# Patient Record
Sex: FEMALE | Race: BLACK OR AFRICAN AMERICAN | NOT HISPANIC OR LATINO | URBAN - METROPOLITAN AREA
[De-identification: names, ages, dates, MRNs, and addresses within clinical notes are randomized per-mention and may not be internally consistent; named-entity substitution may affect disease eponyms.]

---

## 2021-04-09 ENCOUNTER — OFFICE VISIT (OUTPATIENT)
Dept: URBAN - METROPOLITAN AREA SURGERY CENTER 18 | Facility: SURGERY CENTER | Age: 46
End: 2021-04-09

## 2021-04-20 ENCOUNTER — OFFICE VISIT (OUTPATIENT)
Dept: URBAN - METROPOLITAN AREA SURGERY CENTER 15 | Facility: SURGERY CENTER | Age: 46
End: 2021-04-20

## 2021-10-29 ENCOUNTER — OFFICE VISIT (OUTPATIENT)
Dept: URBAN - METROPOLITAN AREA CLINIC 78 | Facility: CLINIC | Age: 46
End: 2021-10-29
Payer: COMMERCIAL

## 2021-10-29 DIAGNOSIS — Z90.3 S/P GASTRIC SLEEVE PROCEDURE: ICD-10-CM

## 2021-10-29 DIAGNOSIS — K59.02 CONSTIPATION BY OUTLET DYSFUNCTION: ICD-10-CM

## 2021-10-29 DIAGNOSIS — R10.84 ABDOMINAL PAIN, GENERALIZED: ICD-10-CM

## 2021-10-29 DIAGNOSIS — Z90.710 H/O HYSTERECTOMY FOR BENIGN DISEASE: ICD-10-CM

## 2021-10-29 PROBLEM — 428804004: Status: ACTIVE | Noted: 2021-10-29

## 2021-10-29 PROBLEM — 14760008: Status: ACTIVE | Noted: 2021-10-29

## 2021-10-29 PROCEDURE — 99203 OFFICE O/P NEW LOW 30 MIN: CPT | Performed by: INTERNAL MEDICINE

## 2021-10-29 PROCEDURE — 99243 OFF/OP CNSLTJ NEW/EST LOW 30: CPT | Performed by: INTERNAL MEDICINE

## 2021-10-29 RX ORDER — AZILSARTAN KAMEDOXOMIL AND CHLORTHALIDONE 40; 25 MG/1; MG/1
1 TABLET TABLET ORAL ONCE A DAY
Status: ACTIVE | COMMUNITY

## 2021-10-29 RX ORDER — NEBIVOLOL HYDROCHLORIDE 5 MG/1
1 TABLET TABLET ORAL ONCE A DAY
Status: ACTIVE | COMMUNITY

## 2021-10-29 NOTE — HPI-TODAY'S VISIT:
Dilation and curettage  11/27/2017    Active  DERIK Patient was referred by Dr. Sallie Garcia /Dr Felicita Zimmerman is her Gynae A copy of this document will be sent to the physician.   Patient has personal hx hsyterectomy 2019 , s/p Gastric sleeve , abdominoplasty . Personal of flexible during hospitalization in 2019 for narcotics bowel which revealed stercoral ulcer  She reports pain 3-4/10 b/l along inguinal area  Last 3-4 mts    BM : Fine now though she has hx of constipation and past and stercoral ulcer on flex sig  Previous GI is Dr Carolina , reviewed in chart   Denies rectal bleeding, weight loss or melena  Unsure if she had b/l oophorectomy but Hystrectomy total was for Fibroids <<-----Click on this checkbox to enter Procedure

## 2021-11-12 ENCOUNTER — OFFICE VISIT (OUTPATIENT)
Dept: URBAN - METROPOLITAN AREA CLINIC 22 | Facility: CLINIC | Age: 46
End: 2021-11-12
Payer: COMMERCIAL

## 2021-11-12 DIAGNOSIS — K76.0 FATTY (CHANGE OF) LIVER: ICD-10-CM

## 2021-11-12 DIAGNOSIS — K82.8 GALLBLADDER SLUDGE: ICD-10-CM

## 2021-11-12 PROCEDURE — 76700 US EXAM ABDOM COMPLETE: CPT | Performed by: INTERNAL MEDICINE

## 2021-11-12 RX ORDER — NEBIVOLOL HYDROCHLORIDE 5 MG/1
1 TABLET TABLET ORAL ONCE A DAY
Status: ACTIVE | COMMUNITY

## 2021-11-12 RX ORDER — AZILSARTAN KAMEDOXOMIL AND CHLORTHALIDONE 40; 25 MG/1; MG/1
1 TABLET TABLET ORAL ONCE A DAY
Status: ACTIVE | COMMUNITY

## 2021-12-22 ENCOUNTER — OFFICE VISIT (OUTPATIENT)
Dept: URBAN - METROPOLITAN AREA CLINIC 96 | Facility: CLINIC | Age: 46
End: 2021-12-22
Payer: COMMERCIAL

## 2021-12-22 VITALS
BODY MASS INDEX: 31.01 KG/M2 | HEART RATE: 76 BPM | TEMPERATURE: 96.9 F | SYSTOLIC BLOOD PRESSURE: 111 MMHG | WEIGHT: 175 LBS | DIASTOLIC BLOOD PRESSURE: 78 MMHG | HEIGHT: 63 IN

## 2021-12-22 DIAGNOSIS — R10.31 RLQ ABDOMINAL PAIN: ICD-10-CM

## 2021-12-22 DIAGNOSIS — R10.13 DYSPEPSIA: ICD-10-CM

## 2021-12-22 DIAGNOSIS — Z12.11 ENCOUNTER FOR SCREENING COLONOSCOPY: ICD-10-CM

## 2021-12-22 DIAGNOSIS — R10.11 RUQ PAIN: ICD-10-CM

## 2021-12-22 DIAGNOSIS — Z87.11 PERSONAL HISTORY OF GASTRIC ULCER: ICD-10-CM

## 2021-12-22 DIAGNOSIS — R12 HEARTBURN: ICD-10-CM

## 2021-12-22 PROCEDURE — 99214 OFFICE O/P EST MOD 30 MIN: CPT | Performed by: INTERNAL MEDICINE

## 2021-12-22 RX ORDER — SODIUM PICOSULFATE, MAGNESIUM OXIDE, AND ANHYDROUS CITRIC ACID 10; 3.5; 12 MG/160ML; G/160ML; G/160ML
160 ML LIQUID ORAL
Qty: 320 MILLILITER | Refills: 0 | OUTPATIENT
Start: 2021-12-22 | End: 2021-12-23

## 2021-12-22 RX ORDER — NEBIVOLOL HYDROCHLORIDE 5 MG/1
1 TABLET TABLET ORAL ONCE A DAY
Status: DISCONTINUED | COMMUNITY

## 2021-12-22 RX ORDER — AZILSARTAN KAMEDOXOMIL AND CHLORTHALIDONE 40; 25 MG/1; MG/1
1 TABLET TABLET ORAL ONCE A DAY
Status: ACTIVE | COMMUNITY

## 2021-12-22 NOTE — PHYSICAL EXAM GASTROINTESTINAL
Abdomen , scars, soft, tender in RLQ with no rebound or guarding, nondistended , no guarding or rigidity , no masses palpable , normal bowel sounds , Liver and Spleen , no hepatomegaly present , no hepatosplenomegaly , liver nontender , spleen not palpable

## 2021-12-22 NOTE — HPI-TODAY'S VISIT:
46-year-old female recently seen by Dr. Lal 10/29/2021 for abdominal cramping.  Patient is status post gastric sleeve.  Dr. Lal had ordered ultrasound 11/12/2021 which demonstrated mild fatty infiltration of the liver as well as a probable 4 mm stone in the gallbladder.  Patient also had constipation and reported history of stercoral ulcer in setting of narcotic use post surgery.    Patient wanted another GI opinion.  Patient did have prior inpatient flexible sigmoidoscopy performed by Dr. James 5/7/2019 for evaluation of abnormal CT (CT report not available for review).  Fair prep was noted.  Single ulcer in the rectum was noted.  Consistent with stercoral ulcer.  Biopsies obtained with pathology demonstrating focal inflammatory changes consistent with ulceration.  Patient now reports RLQ pain onset 10/10/2021 with radiation to upper RUQ. Worse at night, pulsing sensation. No f/c, no no change in BM with baseline stools once daily. Was also seen by surgeon Dr. Willingham this week as well. Did not feel that was gallstone related. He also mentioned may be related to scar tissue. Worse with jumping and high intensity work outs. Perhaps worse with fatty meal intake. No dysphagia, no odynophagia.   No food allergies, no diet restrictions.  Report perhaps heartburn issues. Not taking any meds for GERD. No prior hx of PUD. No hx of H pylori. Remote EGD with Dr. Carolina with ulcer per patient, prior to bariatric surgery, negative for H pylori. No NSAIDs.   UTD with GYN with pelvic ultrasound was unremarkable with normal ovaries per patient, s/o hysteretcomy.  No prior colonoscopy.

## 2021-12-27 ENCOUNTER — TELEPHONE ENCOUNTER (OUTPATIENT)
Dept: URBAN - METROPOLITAN AREA CLINIC 96 | Facility: CLINIC | Age: 46
End: 2021-12-27

## 2022-01-07 ENCOUNTER — TELEPHONE ENCOUNTER (OUTPATIENT)
Dept: URBAN - METROPOLITAN AREA CLINIC 98 | Facility: CLINIC | Age: 47
End: 2022-01-07

## 2022-01-10 ENCOUNTER — OFFICE VISIT (OUTPATIENT)
Dept: URBAN - METROPOLITAN AREA SURGERY CENTER 15 | Facility: SURGERY CENTER | Age: 47
End: 2022-01-10

## 2022-03-03 ENCOUNTER — TELEPHONE ENCOUNTER (OUTPATIENT)
Dept: URBAN - METROPOLITAN AREA CLINIC 40 | Facility: CLINIC | Age: 47
End: 2022-03-03

## 2022-03-07 ENCOUNTER — TELEPHONE ENCOUNTER (OUTPATIENT)
Dept: URBAN - METROPOLITAN AREA CLINIC 96 | Facility: CLINIC | Age: 47
End: 2022-03-07

## 2022-03-17 ENCOUNTER — OFFICE VISIT (OUTPATIENT)
Dept: URBAN - METROPOLITAN AREA SURGERY CENTER 18 | Facility: SURGERY CENTER | Age: 47
End: 2022-03-17
Payer: COMMERCIAL

## 2022-03-17 ENCOUNTER — CLAIMS CREATED FROM THE CLAIM WINDOW (OUTPATIENT)
Dept: URBAN - METROPOLITAN AREA CLINIC 4 | Facility: CLINIC | Age: 47
End: 2022-03-17
Payer: COMMERCIAL

## 2022-03-17 DIAGNOSIS — K31.89 ACQUIRED DEFORMITY OF DUODENUM: ICD-10-CM

## 2022-03-17 DIAGNOSIS — K21.9 GASTRO-ESOPHAGEAL REFLUX DISEASE WITHOUT ESOPHAGITIS: ICD-10-CM

## 2022-03-17 DIAGNOSIS — K31.89 FOCAL FOVEOLAR HYPERPLASIA: ICD-10-CM

## 2022-03-17 DIAGNOSIS — K21.9 ACID REFLUX: ICD-10-CM

## 2022-03-17 PROCEDURE — 88305 TISSUE EXAM BY PATHOLOGIST: CPT | Performed by: PATHOLOGY

## 2022-03-17 PROCEDURE — G8907 PT DOC NO EVENTS ON DISCHARG: HCPCS | Performed by: INTERNAL MEDICINE

## 2022-03-17 PROCEDURE — 45378 DIAGNOSTIC COLONOSCOPY: CPT | Performed by: INTERNAL MEDICINE

## 2022-03-17 PROCEDURE — 43239 EGD BIOPSY SINGLE/MULTIPLE: CPT | Performed by: INTERNAL MEDICINE

## 2022-03-17 RX ORDER — AZILSARTAN KAMEDOXOMIL AND CHLORTHALIDONE 40; 25 MG/1; MG/1
1 TABLET TABLET ORAL ONCE A DAY
Status: ACTIVE | COMMUNITY

## 2023-01-11 ENCOUNTER — OFFICE VISIT (OUTPATIENT)
Dept: URBAN - METROPOLITAN AREA CLINIC 96 | Facility: CLINIC | Age: 48
End: 2023-01-11

## 2023-01-11 RX ORDER — AZILSARTAN KAMEDOXOMIL AND CHLORTHALIDONE 40; 25 MG/1; MG/1
1 TABLET TABLET ORAL ONCE A DAY
Status: ACTIVE | COMMUNITY

## 2023-01-11 RX ORDER — ALUMINUM ZIRCONIUM TRICHLOROHYDREX GLY 0.19 G/G
1 TABLET 30 MINUTES BEFORE MORNING MEAL STICK TOPICAL ONCE A DAY
Status: ACTIVE | COMMUNITY

## 2023-01-11 NOTE — HPI-TODAY'S VISIT:
47-year-old female seen 12/22/2021. Previously seen by Dr. Lal 10/29/2021 for abdominal cramping.  Patient is status post gastric sleeve.  Dr. Lal had ordered ultrasound 11/12/2021 which demonstrated mild fatty infiltration of the liver as well as a probable 4 mm stone in the gallbladder.  Patient also had constipation and reported history of stercoral ulcer in setting of narcotic use post surgery.    Patient did have prior inpatient flexible sigmoidoscopy performed by Dr. James 5/7/2019 for evaluation of abnormal CT (CT report not available for review).  Fair prep was noted.  Single ulcer in the rectum was noted.  Consistent with stercoral ulcer.  Biopsies obtained with pathology demonstrating focal inflammatory changes consistent with ulceration.  Patient previously reported RLQ pain onset 10/10/2021 with radiation to upper RUQ. Worse at night, pulsing sensation. No f/c, no no change in BM with baseline stools once daily. Seen Dr. Willingham who did not feel that was gallstone related. He also mentioned may be related to scar tissue. Worse with jumping and high intensity work outs. Perhaps worse with fatty meal intake. No dysphagia, no odynophagia.   No food allergies, no diet restrictions.  Reported perhaps heartburn issues. No prior hx of PUD. No hx of H pylori. Remote EGD with Dr. Carolina with ulcer per patient, prior to bariatric surgery, negative for H pylori. No NSAIDs.   UTD with GYN with pelvic ultrasound was unremarkable with normal ovaries per patient, s/p hysteretcomy.  EGD and colonoscopy performed 3/17/2022 with sleeve gastrectomy noted.  Regular Z-line at 35 cm.  Small hiatal hernia, grade A esophagitis.  Prilosec OTC 20 mg daily was recommended.  Colonoscopy same date with few diverticuli noted in the sigmoid colon.  Internal hemorrhoids noted on retroflexion.  Otherwise, unremarkable examination.  Repeat colonoscopy was recommended 10 years for screening if no signs, symptoms of concern, or change in risk factors.  Pathology with gastric biopsies no significant abnormality.  Lower esophageal biopsies with reflux type changes.  No evidence of Titus's or eosinophilic esophagitis.  CT abdomen pelvis with contrast performed 1/4/2022 liver normal with no focal mass, no intrahepatic biliary ductal dilatation.  Gallbladder normal.  spleen normal.  Pancreas normal.  Adrenal glands normal.  Normal appendix.  No acute findings of the abdomen or pelvis.  Hysterectomy changes and no ovarian or adnexal masses. No inguinal hernia or fluid collection.  No adenopathy.  (See chart for reviewed records)

## 2023-02-08 ENCOUNTER — WEB ENCOUNTER (OUTPATIENT)
Dept: URBAN - METROPOLITAN AREA CLINIC 96 | Facility: CLINIC | Age: 48
End: 2023-02-08

## 2023-02-08 ENCOUNTER — OFFICE VISIT (OUTPATIENT)
Dept: URBAN - METROPOLITAN AREA CLINIC 96 | Facility: CLINIC | Age: 48
End: 2023-02-08
Payer: COMMERCIAL

## 2023-02-08 VITALS
HEIGHT: 63 IN | SYSTOLIC BLOOD PRESSURE: 112 MMHG | TEMPERATURE: 98.1 F | HEART RATE: 97 BPM | WEIGHT: 178 LBS | DIASTOLIC BLOOD PRESSURE: 80 MMHG | BODY MASS INDEX: 31.54 KG/M2

## 2023-02-08 DIAGNOSIS — R14.2 BELCHING: ICD-10-CM

## 2023-02-08 DIAGNOSIS — R12 HEARTBURN: ICD-10-CM

## 2023-02-08 DIAGNOSIS — Z87.11 PERSONAL HISTORY OF GASTRIC ULCER: ICD-10-CM

## 2023-02-08 DIAGNOSIS — R10.13 DYSPEPSIA: ICD-10-CM

## 2023-02-08 DIAGNOSIS — R10.31 RLQ ABDOMINAL PAIN: ICD-10-CM

## 2023-02-08 DIAGNOSIS — R10.11 RUQ PAIN: ICD-10-CM

## 2023-02-08 PROCEDURE — 99214 OFFICE O/P EST MOD 30 MIN: CPT | Performed by: INTERNAL MEDICINE

## 2023-02-08 RX ORDER — OMEPRAZOLE 40 MG/1
1 CAPSULE 30 MINUTES BEFORE MORNING MEAL CAPSULE, DELAYED RELEASE ORAL ONCE A DAY
Qty: 90 | Refills: 1 | OUTPATIENT
Start: 2023-02-08

## 2023-02-08 RX ORDER — ALUMINUM ZIRCONIUM TRICHLOROHYDREX GLY 0.19 G/G
1 TABLET 30 MINUTES BEFORE MORNING MEAL STICK TOPICAL ONCE A DAY
Status: DISCONTINUED | COMMUNITY

## 2023-02-08 RX ORDER — AZILSARTAN KAMEDOXOMIL AND CHLORTHALIDONE 40; 25 MG/1; MG/1
1 TABLET TABLET ORAL ONCE A DAY
Status: ACTIVE | COMMUNITY

## 2023-02-08 NOTE — PHYSICAL EXAM GASTROINTESTINAL
Abdomen , scars, soft, tender in RLQ with no rebound or guarding, nondistended , no guarding or rigidity , no masses palpable , normal bowel sounds , Liver and Spleen , no hepatomegaly present , no hepatosplenomegaly , liver nontender , spleen not palpable Sarecycline Counseling: Patient advised regarding possible photosensitivity and discoloration of the teeth, skin, lips, tongue and gums.  Patient instructed to avoid sunlight, if possible.  When exposed to sunlight, patients should wear protective clothing, sunglasses, and sunscreen.  The patient was instructed to call the office immediately if the following severe adverse effects occur:  hearing changes, easy bruising/bleeding, severe headache, or vision changes.  The patient verbalized understanding of the proper use and possible adverse effects of sarecycline.  All of the patient's questions and concerns were addressed.

## 2023-02-08 NOTE — HPI-TODAY'S VISIT:
47-year-old female seen 12/22/2021. Previously seen by Dr. Lal 10/29/2021 for abdominal cramping.  Patient is status post gastric sleeve.  Dr. Lal had ordered ultrasound 11/12/2021 which demonstrated mild fatty infiltration of the liver as well as a probable 4 mm stone in the gallbladder.  Patient also had constipation and reported history of stercoral ulcer in setting of narcotic use post surgery.    Patient did have prior inpatient flexible sigmoidoscopy performed by Dr. James 5/7/2019 for evaluation of abnormal CT (CT report not available for review).  Fair prep was noted.  Single ulcer in the rectum was noted.  Consistent with stercoral ulcer.  Biopsies obtained with pathology demonstrating focal inflammatory changes consistent with ulceration.  Patient previously reported RLQ pain onset 10/10/2021 with radiation to upper RUQ. Worse at night, pulsing sensation. No f/c, no no change in BM with baseline stools once daily. Seen Dr. Willingham who did not feel that was gallstone related. He also mentioned may be related to scar tissue. Worse with jumping and high intensity work outs. Perhaps worse with fatty meal intake. No dysphagia, no odynophagia.   No food allergies, no diet restrictions.  Reported perhaps heartburn issues. No prior hx of PUD. No hx of H pylori. Remote EGD with Dr. Carolina with ulcer per patient, prior to bariatric surgery, negative for H pylori. No NSAIDs.   UTD with GYN with pelvic ultrasound was unremarkable with normal ovaries per patient, s/p hysteretcomy.  EGD and colonoscopy performed 3/17/2022 with sleeve gastrectomy noted.  Regular Z-line at 35 cm.  Small hiatal hernia, grade A esophagitis.  Prilosec OTC 20 mg daily was recommended.  Colonoscopy same date with few diverticuli noted in the sigmoid colon.  Internal hemorrhoids noted on retroflexion.  Otherwise, unremarkable examination.  Repeat colonoscopy was recommended 10 years for screening if no signs, symptoms of concern, or change in risk factors.  Pathology with gastric biopsies no significant abnormality.  Lower esophageal biopsies with reflux type changes.  No evidence of Titus's or eosinophilic esophagitis.  CT abdomen pelvis with contrast performed 1/4/2022 liver normal with no focal mass, no intrahepatic biliary ductal dilatation.  Gallbladder normal.  spleen normal.  Pancreas normal.  Adrenal glands normal.  Normal appendix.  No acute findings of the abdomen or pelvis.  Hysterectomy changes and no ovarian or adnexal masses. No inguinal hernia or fluid collection.  No adenopathy.  (See chart for reviewed records)  Patient now presents for several months of "super gassy" with bloating, belching, flatulence. No change in BM, no n/v, worsening heartburn. Not taking any meds for GERD currently. No dysphagia, no odynophagia. Not taking any probiotics.

## 2023-04-04 ENCOUNTER — OFFICE VISIT (OUTPATIENT)
Dept: URBAN - METROPOLITAN AREA TELEHEALTH 2 | Facility: TELEHEALTH | Age: 48
End: 2023-04-04

## 2023-04-04 ENCOUNTER — TELEPHONE ENCOUNTER (OUTPATIENT)
Dept: URBAN - METROPOLITAN AREA CLINIC 35 | Facility: CLINIC | Age: 48
End: 2023-04-04

## 2023-04-04 RX ORDER — OMEPRAZOLE 40 MG/1
1 CAPSULE 30 MINUTES BEFORE MORNING MEAL CAPSULE, DELAYED RELEASE ORAL ONCE A DAY
Qty: 90 | Refills: 1 | Status: ACTIVE | COMMUNITY
Start: 2023-02-08

## 2023-04-04 RX ORDER — AZILSARTAN KAMEDOXOMIL AND CHLORTHALIDONE 40; 25 MG/1; MG/1
1 TABLET TABLET ORAL ONCE A DAY
Status: ACTIVE | COMMUNITY

## 2023-05-15 ENCOUNTER — OFFICE VISIT (OUTPATIENT)
Dept: URBAN - METROPOLITAN AREA CLINIC 96 | Facility: CLINIC | Age: 48
End: 2023-05-15

## 2023-05-15 RX ORDER — AZILSARTAN KAMEDOXOMIL AND CHLORTHALIDONE 40; 25 MG/1; MG/1
1 TABLET TABLET ORAL ONCE A DAY
Status: ACTIVE | COMMUNITY

## 2023-05-15 RX ORDER — OMEPRAZOLE 40 MG/1
1 CAPSULE 30 MINUTES BEFORE MORNING MEAL CAPSULE, DELAYED RELEASE ORAL ONCE A DAY
Qty: 90 | Refills: 1 | OUTPATIENT

## 2023-05-15 RX ORDER — OMEPRAZOLE 40 MG/1
1 CAPSULE 30 MINUTES BEFORE MORNING MEAL CAPSULE, DELAYED RELEASE ORAL ONCE A DAY
Qty: 90 | Refills: 1 | Status: ACTIVE | COMMUNITY
Start: 2023-02-08

## 2023-05-15 NOTE — HPI-TODAY'S VISIT:
47-year-old female seen 2/8/2023. As noted prior, seen by Dr. Lal 10/29/2021 for abdominal cramping.  Patient is status post gastric sleeve.  Dr. Lal had ordered ultrasound 11/12/2021 which demonstrated mild fatty infiltration of the liver as well as a probable 4 mm stone in the gallbladder.  Patient also had constipation and reported history of stercoral ulcer in setting of narcotic use post surgery.    Patient did have prior inpatient flexible sigmoidoscopy performed by Dr. James 5/7/2019 for evaluation of abnormal CT (CT report not available for review).  Fair prep was noted.  Single ulcer in the rectum was noted.  Consistent with stercoral ulcer.  Biopsies obtained with pathology demonstrating focal inflammatory changes consistent with ulceration.  Patient previously reported RLQ pain onset 10/10/2021 with radiation to upper RUQ. Worse at night, pulsing sensation. No f/c, no no change in BM with baseline stools once daily. Seen Dr. Willingham who did not feel that was gallstone related. He also mentioned may be related to scar tissue. Worse with jumping and high intensity work outs. Perhaps worse with fatty meal intake. No dysphagia, no odynophagia.   No food allergies, no diet restrictions.  Reported perhaps heartburn issues. No prior hx of PUD. No hx of H pylori. Remote EGD with Dr. Carolina with ulcer per patient, prior to bariatric surgery, negative for H pylori. No NSAIDs.   UTD with GYN with pelvic ultrasound was unremarkable with normal ovaries per patient, s/p hysteretcomy.  EGD and colonoscopy performed 3/17/2022 with sleeve gastrectomy noted.  Regular Z-line at 35 cm.  Small hiatal hernia, grade A esophagitis.  Prilosec OTC 20 mg daily was recommended.  Colonoscopy same date with few diverticuli noted in the sigmoid colon.  Internal hemorrhoids noted on retroflexion.  Otherwise, unremarkable examination.  Repeat colonoscopy was recommended 10 years for screening if no signs, symptoms of concern, or change in risk factors.  Pathology with gastric biopsies no significant abnormality.  Lower esophageal biopsies with reflux type changes.  No evidence of Titus's or eosinophilic esophagitis.  CT abdomen pelvis with contrast performed 1/4/2022 liver normal with no focal mass, no intrahepatic biliary ductal dilatation.  Gallbladder normal.  spleen normal.  Pancreas normal.  Adrenal glands normal.  Normal appendix.  No acute findings of the abdomen or pelvis.  Hysterectomy changes and no ovarian or adnexal masses. No inguinal hernia or fluid collection.  No adenopathy.  (See chart for reviewed records)  At prior visit, reported several months of "super gassy" with bloating, belching, flatulence. No change in BM, no n/v, worsening heartburn. Not taking any meds for GERD currently. No dysphagia, no odynophagia. Not taking any probiotics.

## 2023-07-11 ENCOUNTER — DASHBOARD ENCOUNTERS (OUTPATIENT)
Age: 48
End: 2023-07-11

## 2023-07-11 ENCOUNTER — OFFICE VISIT (OUTPATIENT)
Dept: URBAN - METROPOLITAN AREA CLINIC 96 | Facility: CLINIC | Age: 48
End: 2023-07-11
Payer: COMMERCIAL

## 2023-07-11 VITALS
SYSTOLIC BLOOD PRESSURE: 100 MMHG | TEMPERATURE: 97.3 F | HEART RATE: 88 BPM | BODY MASS INDEX: 31.18 KG/M2 | HEIGHT: 63 IN | WEIGHT: 176 LBS | DIASTOLIC BLOOD PRESSURE: 68 MMHG

## 2023-07-11 DIAGNOSIS — R12 HEARTBURN: ICD-10-CM

## 2023-07-11 DIAGNOSIS — R14.2 BELCHING: ICD-10-CM

## 2023-07-11 DIAGNOSIS — R10.31 RLQ ABDOMINAL PAIN: ICD-10-CM

## 2023-07-11 DIAGNOSIS — R10.11 RUQ PAIN: ICD-10-CM

## 2023-07-11 DIAGNOSIS — R10.13 DYSPEPSIA: ICD-10-CM

## 2023-07-11 DIAGNOSIS — Z87.11 PERSONAL HISTORY OF GASTRIC ULCER: ICD-10-CM

## 2023-07-11 PROCEDURE — 99213 OFFICE O/P EST LOW 20 MIN: CPT | Performed by: INTERNAL MEDICINE

## 2023-07-11 RX ORDER — AZILSARTAN KAMEDOXOMIL AND CHLORTHALIDONE 40; 25 MG/1; MG/1
1 TABLET TABLET ORAL ONCE A DAY
Status: ACTIVE | COMMUNITY

## 2023-07-11 RX ORDER — OMEPRAZOLE 40 MG/1
1 CAPSULE 30 MINUTES BEFORE MORNING MEAL CAPSULE, DELAYED RELEASE ORAL ONCE A DAY
Qty: 90 | Refills: 3 | OUTPATIENT

## 2023-07-11 RX ORDER — OMEPRAZOLE 40 MG/1
1 CAPSULE 30 MINUTES BEFORE MORNING MEAL CAPSULE, DELAYED RELEASE ORAL ONCE A DAY
Qty: 90 | Refills: 1 | Status: ACTIVE | COMMUNITY

## 2023-07-11 NOTE — HPI-TODAY'S VISIT:
47-year-old female seen 2/8/2023. As noted prior, seen by Dr. Lal 10/29/2021 for abdominal cramping.  Patient is status post gastric sleeve.  Dr. Lal had ordered ultrasound 11/12/2021 which demonstrated mild fatty infiltration of the liver as well as a probable 4 mm stone in the gallbladder.  Patient also had constipation and reported history of stercoral ulcer in setting of narcotic use post surgery.    Patient did have prior inpatient flexible sigmoidoscopy performed by Dr. James 5/7/2019 for evaluation of abnormal CT (CT report not available for review).  Fair prep was noted.  Single ulcer in the rectum was noted.  Consistent with stercoral ulcer.  Biopsies obtained with pathology demonstrating focal inflammatory changes consistent with ulceration.  Patient previously reported RLQ pain onset 10/10/2021 with radiation to upper RUQ. Worse at night, pulsing sensation. No f/c, no no change in BM with baseline stools once daily. Seen Dr. Willingham who did not feel that was gallstone related. He also mentioned may be related to scar tissue. Worse with jumping and high intensity work outs. Perhaps worse with fatty meal intake. No dysphagia, no odynophagia.   No food allergies, no diet restrictions.  Reported perhaps heartburn issues. No prior hx of PUD. No hx of H pylori. Remote EGD with Dr. Carolina with ulcer per patient, prior to bariatric surgery, negative for H pylori. No NSAIDs.   UTD with GYN with pelvic ultrasound was unremarkable with normal ovaries per patient, s/p hysteretcomy.  EGD and colonoscopy performed 3/17/2022 with sleeve gastrectomy noted.  Regular Z-line at 35 cm.  Small hiatal hernia, grade A esophagitis.  Prilosec OTC 20 mg daily was recommended.  Colonoscopy same date with few diverticuli noted in the sigmoid colon.  Internal hemorrhoids noted on retroflexion.  Otherwise, unremarkable examination.  Repeat colonoscopy was recommended 10 years for screening if no signs, symptoms of concern, or change in risk factors.  Pathology with gastric biopsies no significant abnormality.  Lower esophageal biopsies with reflux type changes.  No evidence of Titus's or eosinophilic esophagitis.  CT abdomen pelvis with contrast performed 1/4/2022 liver normal with no focal mass, no intrahepatic biliary ductal dilatation.  Gallbladder normal.  spleen normal.  Pancreas normal.  Adrenal glands normal.  Normal appendix.  No acute findings of the abdomen or pelvis.  Hysterectomy changes and no ovarian or adnexal masses. No inguinal hernia or fluid collection.  No adenopathy.  (See chart for reviewed records)  At prior visit, reported several months of "super gassy" with bloating, belching, flatulence. No change in BM, no n/v, worsening heartburn. Not taking any meds for GERD currently. No dysphagia, no odynophagia. Not taking any probiotics.  Improved with omeprazole daily, better than prior. Reports eating poorly and trying to improve eating and increase exercise.

## 2023-07-26 ENCOUNTER — OFFICE VISIT (OUTPATIENT)
Dept: URBAN - METROPOLITAN AREA CLINIC 96 | Facility: CLINIC | Age: 48
End: 2023-07-26

## 2024-01-12 ENCOUNTER — OFFICE VISIT (OUTPATIENT)
Dept: URBAN - METROPOLITAN AREA TELEHEALTH 2 | Facility: TELEHEALTH | Age: 49
End: 2024-01-12